# Patient Record
Sex: FEMALE | Race: WHITE | NOT HISPANIC OR LATINO | Employment: UNEMPLOYED | URBAN - METROPOLITAN AREA
[De-identification: names, ages, dates, MRNs, and addresses within clinical notes are randomized per-mention and may not be internally consistent; named-entity substitution may affect disease eponyms.]

---
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
## 2022-07-05 ENCOUNTER — TELEPHONE (OUTPATIENT)
Dept: PEDIATRIC CARDIOLOGY | Facility: CLINIC | Age: 22
End: 2022-07-05
Payer: COMMERCIAL

## 2022-07-05 DIAGNOSIS — R94.31 ABNORMAL ECG: ICD-10-CM

## 2022-07-05 DIAGNOSIS — Z98.890 S/P CATHETER ABLATION OF SLOW PATHWAY: ICD-10-CM

## 2022-07-05 DIAGNOSIS — Z86.79 S/P CATHETER ABLATION OF SLOW PATHWAY: ICD-10-CM

## 2022-07-05 DIAGNOSIS — I47.10 SVT (SUPRAVENTRICULAR TACHYCARDIA): Primary | ICD-10-CM

## 2022-07-05 NOTE — TELEPHONE ENCOUNTER
Called and spoke with patient. Scheduled appointment in [place] for her on July 18th, 2022 starting at 11:15am.

## 2022-07-12 DIAGNOSIS — I47.10 SVT (SUPRAVENTRICULAR TACHYCARDIA): Primary | ICD-10-CM

## 2022-07-18 ENCOUNTER — OFFICE VISIT (OUTPATIENT)
Dept: PEDIATRIC CARDIOLOGY | Facility: CLINIC | Age: 22
End: 2022-07-18
Payer: COMMERCIAL

## 2022-07-18 ENCOUNTER — CLINICAL SUPPORT (OUTPATIENT)
Dept: PEDIATRIC CARDIOLOGY | Facility: CLINIC | Age: 22
End: 2022-07-18
Attending: PEDIATRICS
Payer: COMMERCIAL

## 2022-07-18 VITALS
HEART RATE: 69 BPM | SYSTOLIC BLOOD PRESSURE: 135 MMHG | DIASTOLIC BLOOD PRESSURE: 77 MMHG | BODY MASS INDEX: 24.73 KG/M2 | RESPIRATION RATE: 18 BRPM | HEIGHT: 61 IN | WEIGHT: 131 LBS | OXYGEN SATURATION: 99 %

## 2022-07-18 DIAGNOSIS — Z86.79 S/P CATHETER ABLATION OF SLOW PATHWAY: ICD-10-CM

## 2022-07-18 DIAGNOSIS — Z98.890 S/P CATHETER ABLATION OF SLOW PATHWAY: ICD-10-CM

## 2022-07-18 DIAGNOSIS — I47.10 SVT (SUPRAVENTRICULAR TACHYCARDIA): ICD-10-CM

## 2022-07-18 DIAGNOSIS — R94.31 ABNORMAL ECG: ICD-10-CM

## 2022-07-18 PROCEDURE — 99214 PR OFFICE/OUTPT VISIT, EST, LEVL IV, 30-39 MIN: ICD-10-PCS | Mod: S$GLB,,, | Performed by: PEDIATRICS

## 2022-07-18 PROCEDURE — 93242 EXT ECG>48HR<7D RECORDING: CPT | Mod: ,,, | Performed by: PEDIATRICS

## 2022-07-18 PROCEDURE — 3078F DIAST BP <80 MM HG: CPT | Mod: CPTII,S$GLB,, | Performed by: PEDIATRICS

## 2022-07-18 PROCEDURE — 93244 EXT ECG>48HR<7D REV&INTERPJ: CPT | Mod: ,,, | Performed by: PEDIATRICS

## 2022-07-18 PROCEDURE — 93000 ELECTROCARDIOGRAM COMPLETE: CPT | Mod: S$GLB,,, | Performed by: PEDIATRICS

## 2022-07-18 PROCEDURE — 3075F PR MOST RECENT SYSTOLIC BLOOD PRESS GE 130-139MM HG: ICD-10-PCS | Mod: CPTII,S$GLB,, | Performed by: PEDIATRICS

## 2022-07-18 PROCEDURE — 1159F MED LIST DOCD IN RCRD: CPT | Mod: CPTII,S$GLB,, | Performed by: PEDIATRICS

## 2022-07-18 PROCEDURE — 93244 CV 3-14 DAY PEDIATRIC HOLTER MONITOR (CUPID ONLY): ICD-10-PCS | Mod: ,,, | Performed by: PEDIATRICS

## 2022-07-18 PROCEDURE — 3008F BODY MASS INDEX DOCD: CPT | Mod: CPTII,S$GLB,, | Performed by: PEDIATRICS

## 2022-07-18 PROCEDURE — 99214 OFFICE O/P EST MOD 30 MIN: CPT | Mod: S$GLB,,, | Performed by: PEDIATRICS

## 2022-07-18 PROCEDURE — 3008F PR BODY MASS INDEX (BMI) DOCUMENTED: ICD-10-PCS | Mod: CPTII,S$GLB,, | Performed by: PEDIATRICS

## 2022-07-18 PROCEDURE — 93242 CV 3-14 DAY PEDIATRIC HOLTER MONITOR (CUPID ONLY): ICD-10-PCS | Mod: ,,, | Performed by: PEDIATRICS

## 2022-07-18 PROCEDURE — 3078F PR MOST RECENT DIASTOLIC BLOOD PRESSURE < 80 MM HG: ICD-10-PCS | Mod: CPTII,S$GLB,, | Performed by: PEDIATRICS

## 2022-07-18 PROCEDURE — 3075F SYST BP GE 130 - 139MM HG: CPT | Mod: CPTII,S$GLB,, | Performed by: PEDIATRICS

## 2022-07-18 PROCEDURE — 1159F PR MEDICATION LIST DOCUMENTED IN MEDICAL RECORD: ICD-10-PCS | Mod: CPTII,S$GLB,, | Performed by: PEDIATRICS

## 2022-07-18 PROCEDURE — 93000 EKG 12-LEAD PEDIATRIC: ICD-10-PCS | Mod: S$GLB,,, | Performed by: PEDIATRICS

## 2022-07-18 RX ORDER — DESOGESTREL AND ETHINYL ESTRADIOL 0.15-0.03
1 KIT ORAL DAILY
COMMUNITY
End: 2023-06-28

## 2022-07-18 NOTE — PATIENT INSTRUCTIONS
Gail Cali is a 21 y.o. female with hx of AVNRT (a type of SVT) s/p ablation (initially with cryoablation, but RFA was performed after recurrence) in 2017. There has not been any recurrence since the second ablation.    She has had occasionally mildly prolonged QTc on ECG, but many are well within normal limits, and her EST was reassuring with normal QTc during testing. If she were to need a QT-prolonging medication, I would like to obtain an ECG. She can contact us if she wants to know if a medicine is QT-prolonging.    Please contact us if you would like to discuss additional options for postural dizziness / hypotension.      Follow-up:    1 year with ECG and 24-hr heart rhythm monitor  Cardiac medications:    None  Activity restrictions:    No activity restrictions. Fully cleared for all sports practice, training, and competition.  SBE prophylaxis:    None    Please contact us if he has any questions or concerns.  Our clinic from his [phone] during office hours. For urgent night and weekend concerns, call [phone] and ask for the pediatric cardiologist on call to be paged.

## 2022-07-18 NOTE — PROGRESS NOTES
Name: Gail Cali  MRN: [id number]  : 2000    Subjective:   CC: SVT s/p ablation    HPI:    Gail Cali is a 21 y.o. female who presents to [facility] [place] for follow-up regarding SVT s/p ablation.    She presented with history of palpitations who was noted to have SVT at a rate of 230 bpm on an event monitor.  On 17 she had successful slow pathway modification of her slow pathway with cryoablation for AVNRT.  She also has history of borderline QT intervals.  She had a treadmill done in [place] and her QT did shorten appropriately with exercise.  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  She was last seen in 2019.  She is currently playing soccer for [employer].    She reports that she has been doing well since her last visit. She denies any symptoms today. She is playing soccer and working out with no issues. There are no reports of exercise intolerance, dyspnea, syncope and tachypnea. No other cardiovascular or medical concerns are reported.     Past-Medical Hx/Problem List:  1. SVT  a. AVNRT   b. Initial cryoablation on 2017  c. Repeat EPS with RFA of slow pathway on 2017    Family Hx:   No known family history of congenital heart defects or cardiac surgeries in childhood.   No known family members with pacemakers or defibrillators.   No known inherited channelopathies or cardiomyopathies.   No known hx of sudden cardiac death or heart transplant.   No known heart attack in someone less than 50yoa.    Social Hx:   Lives in [place].   Plays soccer at [employer].   Exercises 2-3hrs/day with [employer].   [employer].    Review of Systems:  GEN:  No fevers, No fatigue, No weight-loss, No abnormal weight-gain  EYE:  No significant changes in vision, No eye redness, No lens dislocation  ENT: No  "cough, No congestion, No swelling, No snoring, No hearing loss,   RESP: No increased work of breathing, No dyspnea, No noisy breathing, No hx of pneumothorax  CV:  No chest pain, No palpitations, No tachycardia, No activity or exercise intolerance  GI:  No abdominal pain, No nausea, No vomiting, No diarrhea, No constipation  MARGE: Normal UOP  MSK: No pain, No swelling, No joint dislocations, No scoliosis, No extremity swelling  HEME: No easy bruising or bleeding  NEUR: No history of seizures, No dizziness, No near-syncope, No syncope, No developmental concerns  DERM: No Rashes  PSY: No anxiety, No depression, No hyperactivity  ALL: See below.    Medications & Allergy:  Current Outpatient Medications on File Prior to Visit   Medication Sig Dispense Refill    busPIRone (BUSPAR) 15 MG tablet Take 7.5 mg by mouth.      dextroamphetamine-amphetamine 5 mg Tab Take 1 tablet by mouth 2 (two) times daily.      desogestreL-ethinyl estradioL (APRI) 0.15-0.03 mg per tablet Take 1 tablet by mouth once daily.      L norgest/e.estradioL-e.estrad (SEASONIQUE) 0.15 mg-30 mcg (84)/10 mcg (7) 3MPk Take 1 tablet by mouth once daily.       No current facility-administered medications on file prior to visit.       Review of patient's allergies indicates:  No Known Allergies       Objective:   Vitals:  Vitals:    07/18/22 1149   BP: 135/77   BP Location: Right arm   Patient Position: Sitting   BP Method: Medium (Automatic)   Pulse: 69   Resp: 18   SpO2: 99%   Weight: 59.4 kg (131 lb)   Height: 5' 1.42" (1.56 m)     Body surface area is 1.6 meters squared.  Body mass index is 24.42 kg/m².    Exam:  GEN: No acute distress, Normal appearing  EYE: Anicteric sclerae  ENT: No drainage, Moist mucous membranes  PULM: Normal work of breathing;  Clear to auscultation bilaterally, Good air movement throughout  CV: No chest pain;   Normal S1 & S2,               No murmurs;   No rubs or gallops;  EXT: No cyanosis, No edema   2+ radial and dorsalis " pedis pulses bilaterally  ABD: Soft, Non-distended, Non-tender, Normal bowel sounds  DERM: No rashes  NEUR: Normal gait, Grossly normal tone.  PSY: Normal mood and affect    Results / Data:   ECG:   (07/18/2022) - Sinus rhythm, borderline QTc (443ms)    Holter/Zio:   (07/18/2022)   Pending, placed today.    Echocardiogram: (5/2/2019)  Normal anatomic connections and biventricular systolic function.    EST: (2019, my measurements on prior EST described as normal)  QTc at rest = 453ms  QTc at 2:00 recovery = 415ms  QTc at 4:00 recovery = 440ms    Assessment / Plan:   Gail Cali is a 21 y.o. female with hx of AVNRT (a type of SVT) s/p ablation (initially with cryoablation, but RFA was performed after recurrence) in 2017. There has not been any recurrence since the second ablation.    She has had occasionally mildly prolonged QTc on ECG, but many are well within normal limits, and her EST was reassuring with normal QTc during testing. If she were to need a QT-prolonging medication, I would like to obtain an ECG. She can contact us if she wants to know if a medicine is QT-prolonging.    Please contact us if you would like to discuss additional options for postural dizziness / hypotension.      Follow-up:     1 year with ECG and 24-hr heart rhythm monitor  Cardiac medications:     None  Activity restrictions:     No activity restrictions. Fully cleared for all sports practice, training, and competition.  SBE prophylaxis:     None    Please contact us if he has any questions or concerns.  Our clinic from his [phone] during office hours. For urgent night and weekend concerns, call [phone] and ask for the pediatric cardiologist on call to be paged.

## 2022-07-27 LAB
OHS CV EVENT MONITOR DAY: 1
OHS CV HOLTER HOOKUP DATE: NORMAL
OHS CV HOLTER HOOKUP TIME: NORMAL
OHS CV HOLTER LENGTH DECIMAL HOURS: 38.07
OHS CV HOLTER LENGTH HOURS: 14
OHS CV HOLTER LENGTH MINUTES: 4
OHS CV HOLTER SCAN DATE: NORMAL
OHS CV HOLTER SINUS AVERAGE HR: 70 BPM
OHS CV HOLTER SINUS MAX HR: 202 BPM
OHS CV HOLTER SINUS MIN HR: 42 BPM
OHS CV HOLTER STUDY END DATE: NORMAL
OHS CV HOLTER STUDY END TIME: NORMAL

## 2023-06-27 ENCOUNTER — TELEPHONE (OUTPATIENT)
Dept: PEDIATRIC CARDIOLOGY | Facility: CLINIC | Age: 23
End: 2023-06-27
Payer: COMMERCIAL

## 2023-06-27 NOTE — TELEPHONE ENCOUNTER
Rescheduled virtual appointment to 9am tomorrow. Patient stated that she had an EKG done last week and will have it faxed over to us. Confirmed fax number with her.

## 2023-06-28 ENCOUNTER — OFFICE VISIT (OUTPATIENT)
Dept: PEDIATRIC CARDIOLOGY | Facility: CLINIC | Age: 23
End: 2023-06-28
Payer: COMMERCIAL

## 2023-06-28 ENCOUNTER — HOSPITAL ENCOUNTER (OUTPATIENT)
Dept: PEDIATRIC CARDIOLOGY | Facility: HOSPITAL | Age: 23
Discharge: HOME | End: 2023-06-28
Attending: PHYSICIAN ASSISTANT
Payer: COMMERCIAL

## 2023-06-28 DIAGNOSIS — Z98.890 S/P CATHETER ABLATION OF SLOW PATHWAY: ICD-10-CM

## 2023-06-28 DIAGNOSIS — Z86.79 S/P CATHETER ABLATION OF SLOW PATHWAY: ICD-10-CM

## 2023-06-28 DIAGNOSIS — I47.10 SVT (SUPRAVENTRICULAR TACHYCARDIA): Primary | ICD-10-CM

## 2023-06-28 DIAGNOSIS — R94.31 ABNORMAL ECG: Primary | ICD-10-CM

## 2023-06-28 DIAGNOSIS — I47.10 SVT (SUPRAVENTRICULAR TACHYCARDIA): ICD-10-CM

## 2023-06-28 PROCEDURE — 1159F PR MEDICATION LIST DOCUMENTED IN MEDICAL RECORD: ICD-10-PCS | Mod: CPTII,95,, | Performed by: PHYSICIAN ASSISTANT

## 2023-06-28 PROCEDURE — 1160F PR REVIEW ALL MEDS BY PRESCRIBER/CLIN PHARMACIST DOCUMENTED: ICD-10-PCS | Mod: CPTII,95,, | Performed by: PHYSICIAN ASSISTANT

## 2023-06-28 PROCEDURE — 99213 OFFICE O/P EST LOW 20 MIN: CPT | Mod: 95,,, | Performed by: PHYSICIAN ASSISTANT

## 2023-06-28 PROCEDURE — 93244 EXT ECG>48HR<7D REV&INTERPJ: CPT | Mod: ,,, | Performed by: PEDIATRICS

## 2023-06-28 PROCEDURE — 1159F MED LIST DOCD IN RCRD: CPT | Mod: CPTII,95,, | Performed by: PHYSICIAN ASSISTANT

## 2023-06-28 PROCEDURE — 93244 CV 3-14 DAY PEDIATRIC HOLTER MONITOR (CUPID ONLY): ICD-10-PCS | Mod: ,,, | Performed by: PEDIATRICS

## 2023-06-28 PROCEDURE — 1160F RVW MEDS BY RX/DR IN RCRD: CPT | Mod: CPTII,95,, | Performed by: PHYSICIAN ASSISTANT

## 2023-06-28 PROCEDURE — 99213 PR OFFICE/OUTPT VISIT, EST, LEVL III, 20-29 MIN: ICD-10-PCS | Mod: 95,,, | Performed by: PHYSICIAN ASSISTANT

## 2023-06-28 RX ORDER — DESOGESTREL AND ETHINYL ESTRADIOL 0.15-0.03
KIT ORAL
COMMUNITY

## 2023-06-28 RX ORDER — LISDEXAMFETAMINE DIMESYLATE 20 MG/1
20 CAPSULE ORAL EVERY MORNING
COMMUNITY
Start: 2023-06-01

## 2023-06-28 NOTE — PROGRESS NOTES
[facility]  Gail Cali  2000    Gail aCli is a 22 y.o. female presenting for evaluation of   No chief complaint on file.    The patient location is: home  The chief complaint leading to consultation is: follow up s/p AVNRT ablation and borderline QT interval on ECG, sports clearance   Visit type: audiovisual  Face to Face time with patient: 15  20 minutes of total time spent on the encounter, which includes face to face time and non-face to face time preparing to see the patient (eg, review of tests), Obtaining and/or reviewing separately obtained history, Documenting clinical information in the electronic or other health record, Independently interpreting results (not separately reported) and communicating results to the patient/family/caregiver, or Care coordination (not separately reported).   Each patient to whom he or she provides medical services by telemedicine is:  (1) informed of the relationship between the physician and patient and the respective role of any other health care provider with respect to management of the patient; and (2) notified that he or she may decline to receive medical services by telemedicine and may withdraw from such care at any time.      Subjective:     Gail is here today unaccompanied. She comes in for evaluation of the following concerns:   1. Abnormal ECG    2. S/P catheter ablation of slow pathway    3. SVT (supraventricular tachycardia)        HPI:     Gail is a healthy 22 y.o. girl with history of palpitations who was noted to have SVT at a rate of 230 bpm on an event monitor. 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 She also has history of borderline QT intervals. She had a treadmill done in [place] and her QT did shorten appropriately with exercise. Due to return of palpitations and SVT documented on a monitor, we took her back to the EP lab on 7/5/17 where she had RFA  of her slow pathway for recurrent AVNRT. She is currently playing soccer for [employer]. We repeated her treadmill in 2019 and her QT interval again shortened appropriately with exercise. She was last seen in July 2022 by Dr. Howard.     Interim Hx:  She reports that she has been doing well since her last visit. She denies any symptoms today. She is playing soccer and working out with no issues. There are no reports of exercise intolerance, dyspnea, syncope and tachypnea. No other cardiovascular or medical concerns are reported.     Medications:   Current Outpatient Medications on File Prior to Visit   Medication Sig    busPIRone (BUSPAR) 15 MG tablet Take 7.5 mg by mouth.    desogestreL-ethinyl estradioL (ENSKYCE) 0.15-0.03 mg per tablet     VYVANSE 20 mg capsule Take 20 mg by mouth every morning.    [DISCONTINUED] desogestreL-ethinyl estradioL (APRI) 0.15-0.03 mg per tablet Take 1 tablet by mouth once daily.    [DISCONTINUED] dextroamphetamine-amphetamine 5 mg Tab Take 1 tablet by mouth 2 (two) times daily.     No current facility-administered medications on file prior to visit.     Allergies: Review of patient's allergies indicates:  Allergies not on file  Immunization Status: stated as current, but no records available.     Family History   Problem Relation Age of Onset    Heart attacks under age 50 Paternal Uncle     Arrhythmia Paternal Grandfather         A fib , Post CABG    Hypertension Paternal Grandfather     Heart attacks under age 50 Other     Aortic aneurysm Paternal Grandmother     Cardiomyopathy Neg Hx     Congenital heart disease Neg Hx     Early death Neg Hx     Pacemaker/defibrilator Neg Hx     Premature birth Neg Hx      Past Medical History:   Diagnosis Date    Acne     Migraines     SVT (supraventricular tachycardia)      Family and past medical history reviewed and present in electronic medical record.     ROS:     Review of Systems   Constitutional:  Negative for activity  change, fatigue and unexpected weight change.   HENT:  Negative for congestion, facial swelling, nosebleeds and sore throat.    Eyes:  Negative for discharge and redness.   Respiratory:  Negative for shortness of breath, wheezing and stridor.    Cardiovascular:  Negative for chest pain and leg swelling.   Gastrointestinal:  Negative for abdominal distention, abdominal pain, blood in stool, constipation, diarrhea and nausea.   Musculoskeletal:  Negative for arthralgias and joint swelling.   Skin:  Negative for color change.   Neurological:  Negative for dizziness, syncope, facial asymmetry and light-headedness.   Hematological:  Negative for adenopathy. Does not bruise/bleed easily.     Objective:     Gen:  Awake, alert, NAD  HEENT:  NCAT, MMM and pink  Chest:  No respiratory distress  Neuro:  Grossly nonfocal    Tests:     No testing was obtained today.    I reviewed an ECG from [facility] dated 6/21/2023. The rhythm was sinus rhythm with rsr' in V1, borderline QTC of 450s.     Exercise treadmill was reviewed 5/2/2019:  ECG Baseline electrocardiogram reveals normal sinus rhythm There was normal axis at a rate of 106 bpm.   Stress Findings The patient exercised for 15 minutes and 4 seconds on a modified Darshan protocol, corresponding to a functional capacity of 20 METS, achieving a peak heart rate of 200 bpm, which is 104% of the age predicted maximum heart rate. The test was stopped secondary to atypical leg pain.   The patient reported no symptoms during the stress test.   Blood pressure demonstrated a normal response to stressor. Overall, the patient's exercise capacity was excellent tolerance.   ECG Peak ECG was negative for myocardial ischemia. There was no ST segment deviation noted during stress.   There were no arrhythmias during stress.   There were no arrhythmias during recovery.   QT shortens appropriately with exercise     Echo was reviewed 5/2/2019:  Normal echocardiogram for age      Assessment:      1. Abnormal ECG    2. S/P catheter ablation of slow pathway    3. SVT (supraventricular tachycardia)        Impression:     It is my impression that Gail Cali has had slow pathway modification for AVNRT on 2/17/17 and most recently s/p repeat ablation with RFA on 7/5/17 due to AVNRT recurrence.  She is doing well with no significant symptoms of recurrence. She also has history of borderline QT interval on ECG. I recommended that she avoid QT prolonging medications if possible. She has had genetic testing for LQT that was negative.  She does not have any concerning family or personal history that would be consistent with LQT syndrome.  Her QT does shorten appropriately on treadmill testing. I will send a screening holter today. If this looks good, she can be cleared for soccer at [facility] [employer]. They should notify me for any concerns or questions.    Plan:     Activity:  No restrictions but stop exercise if having palpitations    Medications:  No new but avoid QT prolonging medications    Endocarditis prophylaxis is not recommended in this circumstance.     Follow-Up:     Follow-Up clinic visit in 1 year with ECG and Holter.     Clearance to be emailed to [email or web address]

## 2023-06-28 NOTE — LETTER
July 3, 2023        Richi Son NP  [address]  [address]  [facility] [address]             [facility]  [address]  [facility] [address]  Phone: [phone]  Fax: [phone]   Patient: Gail Cali   MR Number: [id number]   YOB: 2000   Date of Visit: 6/28/2023       Dear Richi Son NP:    Thank you for referring Gail Cali to me for evaluation. Attached you will find relevant portions of my assessment and plan of care.    If you have questions, please do not hesitate to call me. I look forward to following Gail Cali along with you.    Sincerely,      Mariah Spears PA-C            CC  No Recipients    Enclosure

## 2023-07-03 ENCOUNTER — PATIENT MESSAGE (OUTPATIENT)
Dept: PEDIATRIC CARDIOLOGY | Facility: CLINIC | Age: 23
End: 2023-07-03
Payer: COMMERCIAL

## 2023-07-16 NOTE — H&P
[facility]  Cardiac Electrophysiology  History and Physical     Admission Date: 7/5/2017  Code Status: Full Code   Attending Provider: Joan Azul* ; Nickolas Pritchett  Principal Problem:<principal problem not specified>    Subjective:     Chief Complaint:  SVT     HPI: Gail is a healthy 16 year old girl with history of palpitations who was noted to have SVT at a rate of 230 bpm on an event monitor.  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  She also has history of borderline QT intervals. Treadmill done in [place] and her QT did shorten appropriately with exercise.     Interim Hx:  For the first few weeks after the procedure, she had some episodes of palpitations that were different than her SVT.  She wore a monitor that showed one episode of tachycardia that seemed most consistent with sinus tachycardia with a maximum HR of 214 bpm with a gradual offset.  Since then, she has had three more episodes all associated with exercise.  They do not last longer than 5 minutes and are fast on but very gradually slows down.  Gail feels that the episodes are not as fast as they were before but she is pretty convinced that it is still SVT.  Mom says her triggers are hormones and stress.       There are no reports of exercise intolerance, dyspnea, syncope and tachypnea. No other cardiovascular or medical concerns are reported.     Past Medical History:   Diagnosis Date    Migraines     SVT (supraventricular tachycardia)        Past Surgical History:   Procedure Laterality Date    TONSILLECTOMY, ADENOIDECTOMY  2014       Review of patient's allergies indicates:  No Known Allergies    No current facility-administered medications on file prior to encounter.      Current Outpatient Prescriptions on File Prior to Encounter   Medication Sig    clindamycin-benzoyl peroxide (ACANYA) 1.2-2.5 % GlwP Apply topically.    loratadine  (CLARITIN) 10 mg tablet Take 10 mg by mouth.     Family History     Problem Relation (Age of Onset)    Aortic aneurysm Paternal Grandmother    Arrhythmia Paternal Grandfather    Heart attacks under age 50 Paternal Uncle, Other        Social History Main Topics    Smoking status: Never Smoker    Smokeless tobacco: Never Used    Alcohol use No    Drug use: No    Sexual activity: Not on file     ROS   Gen: denies fever chills fatigue weight loss  Resp: denies SOB, cough, hemoptysis  CV: Denies CP, palps, orthopnea, PND, edema  GI: denies abd pain, nausea/vomiting, diarrhea    Objective:     Vital Signs (Most Recent):  Temp: 98.7 °F (37.1 °C) (07/05/17 0704)  Pulse: 68 (07/05/17 0704)  Resp: 16 (07/05/17 0704)  BP: (!) 113/55 (07/05/17 0705)  SpO2: 99 % (07/05/17 0704) Vital Signs (24h Range):  Temp:  [98.7 °F (37.1 °C)] 98.7 °F (37.1 °C)  Pulse:  [68] 68  Resp:  [16] 16  SpO2:  [99 %] 99 %  BP: (113-117)/(55-62) 113/55     Weight: 59 kg (130 lb)  Body mass index is 22.31 kg/m².    SpO2: 99 %  O2 Device (Oxygen Therapy): room air    Physical Exam   Constitutional: She is oriented to person, place, and time. She appears well-developed and well-nourished. No distress.   HENT:   Head: Normocephalic and atraumatic.   Nose: Nose normal.   Mouth/Throat: Oropharynx is clear and moist.   Eyes: Conjunctivae and EOM are normal. No scleral icterus.   Neck: Normal range of motion. No JVD present.   Cardiovascular: Normal rate, regular rhythm, normal heart sounds and intact distal pulses.  Exam reveals no gallop and no friction rub.    No murmur heard.  Pulmonary/Chest: Effort normal and breath sounds normal. No stridor. She has no wheezes. She exhibits no tenderness.   Abdominal: Soft. Bowel sounds are normal. She exhibits no distension and no mass. There is no tenderness.   Musculoskeletal: Normal range of motion. She exhibits no edema.   Neurological: She is alert and oriented to person, place, and time. Coordination normal.    Skin: Skin is warm and dry.     Significant Labs: CMP: No results for input(s): NA, K, CL, CO2, GLU, BUN, CREATININE, CALCIUM, PROT, ALBUMIN, BILITOT, ALKPHOS, AST, ALT, ANIONGAP, ESTGFRAFRICA, EGFRNONAA in the last 48 hours., CBC: No results for input(s): WBC, HGB, HCT, PLT in the last 48 hours., INR: No results for input(s): INR, PROTIME in the last 48 hours., Lipid Panel No results for input(s): CHOL, HDL, LDLCALC, TRIG, CHOLHDL in the last 48 hours. and All pertinent lab results from the last 24 hours have been reviewed.    Significant Imaging:   EKG -- NSR        Assessment and Plan:     Active Diagnoses:    Diagnosis Date Noted POA    SVT (supraventricular tachycardia) [I47.1] 01/31/2017 Yes      Problems Resolved During this Admission:    Diagnosis Date Noted Date Resolved POA     Gail Cali is a 16 year old female with history of AVNRT s/p cryoablation of slow pathway in 2/2017 who has had recurrence of palpitations suspicious for recurrence of SVT. She was seen in Dr. Azul clinic 5/29/2017 and planned for SVT ablation. The case was discussed with the patient and her parents and all questions were answered regarding the procedure. Risk and benefit of the procedure were explained to the patient and family and they wish to proceed.    --proceed with SVT ablation this AM    Rolando Bhandari MD  Cardiac Electrophysiology  [facility]     I personally performed the service described in the documentation recorded by the scribe in my presence, and it accurately and completely records my words and actions.

## 2023-07-26 LAB
OHS CV EVENT MONITOR DAY: 1
OHS CV HOLTER HOOKUP DATE: NORMAL
OHS CV HOLTER HOOKUP TIME: NORMAL
OHS CV HOLTER LENGTH DECIMAL HOURS: 144
OHS CV HOLTER LENGTH HOURS: 120
OHS CV HOLTER LENGTH MINUTES: 0
OHS CV HOLTER SCAN DATE: NORMAL
OHS CV HOLTER SINUS AVERAGE HR: 80 BPM
OHS CV HOLTER SINUS MAX HR: 203 BPM
OHS CV HOLTER SINUS MIN HR: 43 BPM
OHS CV HOLTER STUDY END DATE: NORMAL
OHS CV HOLTER STUDY END TIME: NORMAL

## 2023-07-28 ENCOUNTER — TELEPHONE (OUTPATIENT)
Dept: PEDIATRIC CARDIOLOGY | Facility: CLINIC | Age: 23
End: 2023-07-28
Payer: COMMERCIAL

## 2023-07-28 NOTE — TELEPHONE ENCOUNTER
Faxed over the most recent clinic note from Mariah Spears PA-C and the holter results to Geoffrey at the [facility].

## 2025-02-28 ENCOUNTER — E-VISIT (OUTPATIENT)
Dept: URGENT CARE | Facility: CLINIC | Age: 25
End: 2025-02-28
Payer: COMMERCIAL

## 2025-02-28 DIAGNOSIS — Z71.89 ENCOUNTER FOR MEDICATION COUNSELING: Primary | ICD-10-CM

## 2025-02-28 DIAGNOSIS — R94.31 ABNORMAL ECG: ICD-10-CM

## 2025-02-28 NOTE — PROGRESS NOTES
Patient ID: Gail Cali is a 24 y.o. female.    Chief Complaint: General Illness (Entered automatically based on patient selection in [employer].)          274}  The patient initiated a request through [employer] on 2/28/2025 for evaluation and management with a chief complaint of General Illness (Entered automatically based on patient selection in [employer].)     I evaluated the questionnaire submission on 02/28/2025 .    Total Time (in minutes): 6     [facility] Peq Evisit Supergroup-Medication    2/28/2025  2:59 PM CST - Filed by Patient   What do you need help with? Other Concern   Do you agree to participate in an E-Visit? Yes   If you have any of the following symptoms, please go to the nearest emergency room or call 911: I acknowledge   Do you have any of the following pregnancy-related conditions? None   What is the main issue you would like addressed today? Ask if I can take Tamiflu with borderline QT   Please describe your symptoms. Tested positive for Flu   Where is your problem located? Whole body   On a scale of 1-10, where 10 is the worst you can imagine, how severe are your symptoms? (range: 1 - 10) 7   Have you had these symptoms before? Yes   How long have you had these symptoms? A few days   What helps with your symptoms? Advil   What makes your symptoms feel worse? N/A   Are your symptoms related to a condition you currently have? No   Please describe any probable cause for your symptoms. Flu   Provide any additional information you feel is important. Was told to stay away from meds that can prolong QT interval. Want to know if i can take Tamiflu   Please attach any relevant images or files    Are you able to take your vital signs? No          Active Problem List with Overview Notes    Diagnosis Date Noted    Abnormal ECG 07/24/2017    S/P catheter ablation of slow pathway 02/17/2017    SVT (supraventricular tachycardia) 01/31/2017      Recent Labs Obtained:  Lab Results   Component Value Date    WBC 4.4  05/22/2024    HGB 13.3 05/22/2024    HCT 41.4 05/22/2024    MCV 83 07/05/2017     05/22/2024     07/05/2017    K 4.0 07/05/2017    GLU 90 07/05/2017    CREATININE 0.8 07/05/2017    HGBA1C 4.9 05/22/2024    TSH 3.19 05/22/2024      Review of patient's allergies indicates:  No Known Allergies    Encounter Diagnoses   Name Primary?    Encounter for medication counseling Yes    Abnormal ECG         No orders of the defined types were placed in this encounter.         Tamiflu does not increase qtc and safe to take   E-Visit Time Tracking:    Day 1 Time (in minutes): 6    Total Time (in minutes): 6      274}